# Patient Record
Sex: FEMALE | Race: OTHER | HISPANIC OR LATINO | Employment: FULL TIME | URBAN - METROPOLITAN AREA
[De-identification: names, ages, dates, MRNs, and addresses within clinical notes are randomized per-mention and may not be internally consistent; named-entity substitution may affect disease eponyms.]

---

## 2023-03-08 ENCOUNTER — OFFICE VISIT (OUTPATIENT)
Dept: URGENT CARE | Age: 23
End: 2023-03-08

## 2023-03-08 VITALS
DIASTOLIC BLOOD PRESSURE: 70 MMHG | OXYGEN SATURATION: 99 % | HEART RATE: 69 BPM | WEIGHT: 128.4 LBS | TEMPERATURE: 98.1 F | RESPIRATION RATE: 20 BRPM | SYSTOLIC BLOOD PRESSURE: 108 MMHG

## 2023-03-08 DIAGNOSIS — J02.9 SORE THROAT: Primary | ICD-10-CM

## 2023-03-08 DIAGNOSIS — R09.81 NASAL CONGESTION: ICD-10-CM

## 2023-03-08 LAB — S PYO AG THROAT QL: NEGATIVE

## 2023-03-08 RX ORDER — FLUTICASONE PROPIONATE 50 MCG
2 SPRAY, SUSPENSION (ML) NASAL DAILY
Qty: 15.8 ML | Refills: 0 | Status: SHIPPED | OUTPATIENT
Start: 2023-03-08

## 2023-03-08 NOTE — PATIENT INSTRUCTIONS
Use flonase as directed  Recommend OTC non-drowsy first generation antihistamine such as claritin or zyrtec  Recommend throat lozenges, anesthetic spray such as chloraseptic, and gargling warm salt water for throat irritation  Hydration and rest   Acetaminophen and ibuprofen for pain relief and fever reduction  Throat culture sent  Use the [facility] MyChart to obtain lab results  PCP follow up in 3-5 days  Go to an emergency department if difficulty breathing occurs or if symptoms worsen

## 2023-03-08 NOTE — PROGRESS NOTES
[facility]        NAME: Lexie Ragsdale is a 20 y o  female  : 2000    MRN: [id number]  DATE: 2023  TIME: 5:55 PM      Assessment and Plan     Sore throat [J02 9]  1  Sore throat  POCT rapid strepA    fluticasone (FLONASE) 50 mcg/act nasal spray    Throat culture      2  Nasal congestion          Rapid strep negative  Throat culture sent  Will hold on antibiotics at this time- no cervical adenopathy, no fever, no exudate       Patient Instructions     Use flonase as directed  Recommend OTC non-drowsy first generation antihistamine such as claritin or zyrtec  Recommend throat lozenges, anesthetic spray such as chloraseptic, and gargling warm salt water for throat irritation  Hydration and rest   Acetaminophen and ibuprofen for pain relief and fever reduction  Throat culture sent  [facility] the [facility] to obtain lab results  PCP follow up in 3-5 days  Go to an emergency department if difficulty breathing occurs or if symptoms worsen  Chief Complaint     Chief Complaint   Patient presents with   • Sore Throat     Pt started today with a sore throat, painful swallowing and itching  Denies fevers  Hx of strep  Tylenol taken approx 11am            History of Present Illness     Patient is a 24-year-old female who presents with cough and congestion for one week  Reports post nasal drip and ear pain  Reports sore throat that just started  Denies fever  Denies vomiting or diarrhea  Denies chance of pregnancy  Reports history of seasonal allergies  Review of Systems     Review of Systems   Constitutional: Negative for fever  HENT: Positive for congestion, ear pain, postnasal drip and sore throat  Respiratory: Positive for cough  Gastrointestinal: Negative for diarrhea and vomiting  All other systems reviewed and are negative          Current Medications       Current Outpatient Medications:   •  fluticasone (FLONASE) 50 mcg/act nasal spray, 2 sprays into each nostril daily, Disp: 15 8 mL, Rfl: 0    Current Allergies     Allergies as of 03/08/2023   • (No Known Allergies)              The following portions of the patient's history were reviewed and updated as appropriate: allergies, current medications, past family history, past medical history, past social history, past surgical history and problem list      Past Medical History:   Diagnosis Date   • Strep pharyngitis        History reviewed  No pertinent surgical history  History reviewed  No pertinent family history  Medications have been verified  Objective     /70 (BP Location: Right arm, Patient Position: Sitting, Cuff Size: Standard)   Pulse 69   Temp 98 1 °F (36 7 °C) (Tympanic)   Resp 20   Wt 58 2 kg (128 lb 6 4 oz)   LMP 03/02/2023 (Exact Date)   SpO2 99%   Patient's last menstrual period was 03/02/2023 (exact date)  Physical Exam     Physical Exam  Vitals and nursing note reviewed  Constitutional:       General: She is awake  She is not in acute distress  Appearance: Normal appearance  She is not ill-appearing, toxic-appearing or diaphoretic  HENT:      Right Ear: Tympanic membrane, ear canal and external ear normal  No tenderness  Left Ear: Tympanic membrane, ear canal and external ear normal  No tenderness  Ears:      Comments: Dried ear canal's bilaterally     Nose: Congestion present  Mouth/Throat:      Lips: Pink  Mouth: Mucous membranes are moist       Pharynx: Oropharynx is clear  Uvula midline  Posterior oropharyngeal erythema present  Tonsils: No tonsillar exudate  1+ on the right  1+ on the left  Cardiovascular:      Rate and Rhythm: Normal rate  Pulses: Normal pulses  Heart sounds: Normal heart sounds, S1 normal and S2 normal    Pulmonary:      Effort: Pulmonary effort is normal       Breath sounds: Normal breath sounds and air entry  Musculoskeletal:      Cervical back: Neck supple     Lymphadenopathy:      Cervical: No cervical adenopathy  Skin:     General: Skin is warm  Capillary Refill: Capillary refill takes less than 2 seconds  Neurological:      Mental Status: She is alert  Psychiatric:         Mood and Affect: Mood normal          Behavior: Behavior normal          Thought Content:  Thought content normal          Judgment: Judgment normal

## 2023-03-08 NOTE — LETTER
March 8, 2023     Patient: Naty Jorge   YOB: 2000   Date of Visit: 3/8/2023       To Whom It May Concern:     It is my medical opinion that Naty Clonts may return to work on 3/9/23       Sincerely,        Sis Speaker, [id number]    CC: No Recipients

## 2023-03-10 LAB — BACTERIA THROAT CULT: NORMAL
